# Patient Record
Sex: FEMALE | Race: WHITE | NOT HISPANIC OR LATINO | Employment: PART TIME | URBAN - METROPOLITAN AREA
[De-identification: names, ages, dates, MRNs, and addresses within clinical notes are randomized per-mention and may not be internally consistent; named-entity substitution may affect disease eponyms.]

---

## 2017-12-01 ENCOUNTER — TRANSCRIBE ORDERS (OUTPATIENT)
Dept: ADMINISTRATIVE | Facility: HOSPITAL | Age: 47
End: 2017-12-01

## 2017-12-01 DIAGNOSIS — Z12.39 SCREENING BREAST EXAMINATION: Primary | ICD-10-CM

## 2018-01-08 ENCOUNTER — HOSPITAL ENCOUNTER (OUTPATIENT)
Dept: MAMMOGRAPHY | Facility: CLINIC | Age: 48
Discharge: HOME | End: 2018-01-08
Payer: COMMERCIAL

## 2018-01-08 ENCOUNTER — GENERIC CONVERSION - ENCOUNTER (OUTPATIENT)
Dept: OTHER | Facility: OTHER | Age: 48
End: 2018-01-08

## 2018-01-08 DIAGNOSIS — Z12.39 SCREENING BREAST EXAMINATION: ICD-10-CM

## 2018-01-08 PROCEDURE — 77063 BREAST TOMOSYNTHESIS BI: CPT

## 2018-01-08 PROCEDURE — 77067 SCR MAMMO BI INCL CAD: CPT

## 2018-01-10 NOTE — MISCELLANEOUS
Message  Spoke with patient over the phone today  Her diagnosis is chronic irritation caused by an itch scratch cycle, we do not know the initiating cause  I asked her to stop scratching herself and we will treat her for on a short-term basis with clobetasol        Signatures   Electronically signed by : MARGUERITE Bull ; Feb 4 2016  1:21PM EST                       (Author)

## 2018-06-21 NOTE — PROGRESS NOTES
Assessment/Plan:    Encounter for screening mammogram for malignant neoplasm of breast  -     Mammo screening bilateral w cad; Future    Encounter for annual routine gynecological examination  -     Liquid-based pap, screening      Discussed perimenopause  Discussed weight loss - consider decreasing carbs/increasing proteins and counting her calories  Will refill clobetasol for her lichen  Subjective:      Patient ID: Fe Ceballos is a 57 y o  female  Yearly exam     SAB2  LMP: 2018  Menarche:14  Contraception: condoms  Pap: 2014 neg HPV neg  Mammo: 2018 Birad#2    Tammy Ferrer is here for annual exam   She was last seen in  by Dr Francine Anguiano  , monogamous  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  Eldest daughter is 23yo and will be going to college in the fall - Tammy Ferrer is sad about this  Very active, but notes has put on ~ 10lbs this past year  Interested in weight loss  Has hx lichen simplex chronicus - had steroid cream which worked, but has run out  Last biopsied  by [facility]  Also has recurrent yeast per her report  No bladder, breast, bowel concerns  Has noted some change in her menstrual cycles - are now regular for the first time in her life ~ 28d, has some light spotting with ovulation  Heavier/crampier now than used to be  The following portions of the patient's history were reviewed and updated as appropriate: allergies, current medications, past family history, past medical history, past social history, past surgical history and problem list     Review of Systems   Constitutional: Positive for unexpected weight change (10lbs)  Negative for activity change  Gastrointestinal: Negative for abdominal distention, abdominal pain, constipation and diarrhea  Genitourinary: Negative for dyspareunia, dysuria, menstrual problem, pelvic pain, vaginal bleeding, vaginal discharge and vaginal pain           Objective:    /80   Ht 5' 1 5" (1 562 m) Wt 65 kg (143 lb 6 4 oz)   LMP 06/04/2018   BMI 26 66 kg/m²        Physical Exam   Constitutional: She is oriented to person, place, and time  She appears well-developed and well-nourished  No distress  Pulmonary/Chest: Effort normal  No respiratory distress  Abdominal: Soft  She exhibits no distension  There is no tenderness  Genitourinary: Vagina normal and uterus normal        There is no rash or lesion on the right labia  There is no rash or lesion on the left labia  Uterus is not enlarged and not tender  Cervix exhibits friability  Cervix exhibits no motion tenderness  Right adnexum displays no mass and no tenderness  Left adnexum displays no mass and no tenderness  No vaginal discharge found  Genitourinary Comments: Cervix:  Abnormal vasculature noted, friable  Firm to palpation   Musculoskeletal: Normal range of motion  She exhibits no edema  Neurological: She is alert and oriented to person, place, and time  Skin: Skin is warm and dry  Psychiatric: She has a normal mood and affect  Vitals reviewed

## 2018-06-26 ENCOUNTER — ANNUAL EXAM (OUTPATIENT)
Dept: OBGYN CLINIC | Facility: CLINIC | Age: 48
End: 2018-06-26
Payer: COMMERCIAL

## 2018-06-26 VITALS
DIASTOLIC BLOOD PRESSURE: 80 MMHG | HEIGHT: 62 IN | WEIGHT: 143.4 LBS | BODY MASS INDEX: 26.39 KG/M2 | SYSTOLIC BLOOD PRESSURE: 120 MMHG

## 2018-06-26 DIAGNOSIS — L28.0 LICHEN SIMPLEX CHRONICUS: ICD-10-CM

## 2018-06-26 DIAGNOSIS — Z01.419 ENCOUNTER FOR ANNUAL ROUTINE GYNECOLOGICAL EXAMINATION: Primary | ICD-10-CM

## 2018-06-26 DIAGNOSIS — Z12.31 ENCOUNTER FOR SCREENING MAMMOGRAM FOR MALIGNANT NEOPLASM OF BREAST: ICD-10-CM

## 2018-06-26 PROBLEM — M35.00 SJOEGREN SYNDROME: Status: ACTIVE | Noted: 2018-06-26

## 2018-06-26 PROCEDURE — G0145 SCR C/V CYTO,THINLAYER,RESCR: HCPCS | Performed by: OBSTETRICS & GYNECOLOGY

## 2018-06-26 PROCEDURE — 87624 HPV HI-RISK TYP POOLED RSLT: CPT | Performed by: OBSTETRICS & GYNECOLOGY

## 2018-06-26 PROCEDURE — 99396 PREV VISIT EST AGE 40-64: CPT | Performed by: OBSTETRICS & GYNECOLOGY

## 2018-06-26 RX ORDER — CLOBETASOL PROPIONATE 0.5 MG/G
CREAM TOPICAL 2 TIMES DAILY
COMMUNITY
Start: 2016-02-04 | End: 2018-06-26 | Stop reason: SDUPTHER

## 2018-06-26 RX ORDER — LORAZEPAM 0.5 MG/1
TABLET ORAL
COMMUNITY
Start: 2017-11-14

## 2018-06-26 RX ORDER — KETOCONAZOLE 20 MG/ML
SHAMPOO TOPICAL
Refills: 6 | COMMUNITY
Start: 2018-03-26

## 2018-06-26 RX ORDER — CYCLOSPORINE 0.5 MG/ML
EMULSION OPHTHALMIC
COMMUNITY

## 2018-06-26 RX ORDER — CLOBETASOL PROPIONATE 0.5 MG/G
CREAM TOPICAL 2 TIMES DAILY
Qty: 30 G | Refills: 2 | Status: SHIPPED | OUTPATIENT
Start: 2018-06-26

## 2018-06-26 RX ORDER — AVOBENZONE, HOMOSALATE, OCTISALATE, OCTOCRYLENE, OXYBENZONE 30; 130; 50; 70; 40 MG/ML; MG/ML; MG/ML; MG/ML; MG/ML
1 LOTION TOPICAL
COMMUNITY

## 2018-06-28 LAB — HPV RRNA GENITAL QL NAA+PROBE: NORMAL

## 2018-07-03 LAB
LAB AP GYN PRIMARY INTERPRETATION: NORMAL
Lab: NORMAL

## 2018-11-02 ENCOUNTER — OFFICE VISIT (OUTPATIENT)
Dept: OBGYN CLINIC | Facility: CLINIC | Age: 48
End: 2018-11-02
Payer: COMMERCIAL

## 2018-11-02 VITALS
DIASTOLIC BLOOD PRESSURE: 70 MMHG | HEIGHT: 63 IN | BODY MASS INDEX: 25.16 KG/M2 | WEIGHT: 142 LBS | SYSTOLIC BLOOD PRESSURE: 130 MMHG

## 2018-11-02 DIAGNOSIS — N92.6 IRREGULAR UTERINE BLEEDING: ICD-10-CM

## 2018-11-02 DIAGNOSIS — N93.8 DUB (DYSFUNCTIONAL UTERINE BLEEDING): Primary | ICD-10-CM

## 2018-11-02 LAB — SL AMB POCT URINE HCG: NORMAL

## 2018-11-02 PROCEDURE — 58100 BIOPSY OF UTERUS LINING: CPT | Performed by: NURSE PRACTITIONER

## 2018-11-02 PROCEDURE — 81025 URINE PREGNANCY TEST: CPT | Performed by: NURSE PRACTITIONER

## 2018-11-02 PROCEDURE — 88305 TISSUE EXAM BY PATHOLOGIST: CPT | Performed by: PATHOLOGY

## 2018-11-02 PROCEDURE — 99214 OFFICE O/P EST MOD 30 MIN: CPT | Performed by: NURSE PRACTITIONER

## 2018-11-05 ENCOUNTER — HOSPITAL ENCOUNTER (OUTPATIENT)
Dept: RADIOLOGY | Facility: HOSPITAL | Age: 48
Discharge: HOME | End: 2018-11-05
Payer: COMMERCIAL

## 2018-11-05 DIAGNOSIS — N93.8 DUB (DYSFUNCTIONAL UTERINE BLEEDING): ICD-10-CM

## 2018-11-05 PROCEDURE — 76830 TRANSVAGINAL US NON-OB: CPT

## 2018-11-05 PROCEDURE — 76856 US EXAM PELVIC COMPLETE: CPT

## 2018-11-12 NOTE — ASSESSMENT & PLAN NOTE
Discussed causes of DUB  Advised benign perimenopausal hormone imbalance suspected  EMB collected  Advised pelvic US and TSH  Will discuss options for management when all results are available   Pt agrees with plan

## 2018-11-12 NOTE — PROGRESS NOTES
Endometrial biopsy  Date/Time: 11/12/2018 7:52 AM  Performed by: Eliza Piedra  Authorized by: Eliza Piedra     Consent:     Consent obtained:  Verbal and written    Consent given by:  Patient    Procedural risks discussed:  Bleeding, damage to other organs, infection, failure rate, possible continued pain, possible conversion to open surgery, possible loss of function and repeat procedure    Patient questions answered: yes      Patient agrees, verbalizes understanding, and wants to proceed: yes    Indication:     Indications comment:  Dub  Pre-procedure:     Pre-procedure timeout performed: yes    Procedure:     Procedure: endometrial biopsy with Pipelle      A bivalve speculum was placed in the vagina: yes      Cervix cleaned and prepped: yes      Uterus sounded: yes      Uterus sound depth (cm):  7 5    Specimen collected: specimen collected and sent to pathology      Patient tolerated procedure well with no complications: yes    Findings:     Uterus size:  Non-gravid    Cervix: normal      Adnexa: normal    Comments:      EMB advised for c/o DUB  Reviewed risks/benefits of this procedure and consent was obtained  Allergies confirmed and time out performed  The patient was positioned in lithotomy and spec was inserted  Cervix was visualized and cleansed with betadine x3  Pipelle was passed easily through the cervix and a mod amt of tissue was obtained in two sweeps  The patient tolerated well  Hemostasis noted  All instruments removed  Tissue sent to path  Reviewed post procedure considerations and reasons to call

## 2018-11-12 NOTE — PROGRESS NOTES
Assessment/Plan:    DUB (dysfunctional uterine bleeding)  Discussed causes of DUB  Advised benign perimenopausal hormone imbalance suspected  EMB collected  Advised pelvic US and TSH  Will discuss options for management when all results are available  Pt agrees with plan       Diagnoses and all orders for this visit:    DUB (dysfunctional uterine bleeding)  -     US pelvis complete w transvaginal; Future  -     TSH, 3rd generation; Future  -     Tissue Exam    Irregular uterine bleeding  -     POCT urine HCG          Subjective:      Patient ID: Khai Roldan is a 57 y o  female  This patient presents with c/o irreg menses since 6/2018  Spotting mid cycle in June, July and August  Two full periods in Sept with one week off between  Bleeding with menses has been somewhat heavier and clottier   She does experience cramping with the bleeding   Condoms for contra  She denies preg concerns  Feeling ok otherwise  Medically stable         The following portions of the patient's history were reviewed and updated as appropriate: allergies, current medications, past family history, past medical history, past social history, past surgical history and problem list     Review of Systems   Constitutional: Negative  HENT: Negative  Eyes: Negative  Respiratory: Negative  Cardiovascular: Negative  Gastrointestinal: Negative  Endocrine: Negative  Genitourinary: Positive for menstrual problem  Negative for dysuria, pelvic pain, vaginal bleeding and vaginal discharge  Musculoskeletal: Negative  Skin: Negative  Allergic/Immunologic: Negative  Neurological: Negative  Hematological: Negative  Psychiatric/Behavioral: Negative  Objective:      /70 (BP Location: Right arm, Patient Position: Sitting)   Ht 5' 3" (1 6 m)   Wt 64 4 kg (142 lb)   LMP 10/19/2018   Breastfeeding? No   BMI 25 15 kg/m²          Physical Exam   Constitutional: She is oriented to person, place, and time   She appears well-developed and well-nourished  HENT:   Head: Normocephalic and atraumatic  Eyes: Pupils are equal, round, and reactive to light  Neck: Normal range of motion  Pulmonary/Chest: Effort normal    Abdominal: Hernia confirmed negative in the right inguinal area and confirmed negative in the left inguinal area  Genitourinary: Rectum normal and uterus normal  There is no rash, tenderness, lesion or injury on the right labia  There is no rash, tenderness, lesion or injury on the left labia  Cervix exhibits no motion tenderness, no discharge and no friability  Right adnexum displays no mass, no tenderness and no fullness  Left adnexum displays no mass, no tenderness and no fullness  No erythema, tenderness or bleeding in the vagina  No vaginal discharge found  Musculoskeletal: Normal range of motion  Lymphadenopathy:        Right: No inguinal adenopathy present  Left: No inguinal adenopathy present  Neurological: She is alert and oriented to person, place, and time  Skin: Skin is warm and dry  Psychiatric: She has a normal mood and affect   Her behavior is normal  Judgment and thought content normal

## 2018-11-19 ENCOUNTER — TELEPHONE (OUTPATIENT)
Dept: OBGYN CLINIC | Facility: MEDICAL CENTER | Age: 48
End: 2018-11-19

## 2018-11-19 NOTE — TELEPHONE ENCOUNTER
Reviewed results with patient, lab work will be done shortly   Pt questioned what would be done to resolve the irreg bleeding issue, I advised that once lab results are available, Luis Keen would discuss options

## 2018-11-19 NOTE — TELEPHONE ENCOUNTER
----- Message from Saran Julian Jasen Garcia sent at 11/16/2018 10:08 AM EST -----  Pelvic US was ordered for irreg menses   Uterus is WNL   There is an incidental finding of a ~4cm hemorrhagic cyst on the right ovary  Please advise this is benign and is expected to resolve spontaneously  Left ovary is normal   EMB has also resulted and this was WNL   I would recommend that she observes for sx of ovarian torsion and has repeat pelvic US in 2-3 mos  I will enter this order  Regarding irreg menses - we discussed plan to follow up when results were available for review   Please advise that she may RTO any time to discuss management options

## 2019-01-31 ENCOUNTER — TELEPHONE (OUTPATIENT)
Dept: OBGYN CLINIC | Facility: CLINIC | Age: 49
End: 2019-01-31

## 2019-01-31 DIAGNOSIS — N83.209 CYST OF OVARY, UNSPECIFIED LATERALITY: Primary | ICD-10-CM

## 2019-01-31 NOTE — TELEPHONE ENCOUNTER
Spoke with pt - she is going to be here in March for her yearly and wants to have the mammo and f/u pelvic u/s done at that time  Stated the orders are in her chart she can  She thinks that she will come back here every year for her annuals instead of finding a new GYN in [place]

## 2019-01-31 NOTE — TELEPHONE ENCOUNTER
----- Message from Radha Forward  [address] sent at 1/31/2019 12:30 PM EST -----  Regarding: Non-Urgent Medical Question  Contact: [phone]  I have a follow up question from my last visit/ ultrasound results  It was recommended that I receive a follow up ultrasound to make sure the ovarian cyst has resolved  Just three weeks ago, I moved from [facility] to the [place], [place] area  I am wondering if I should get that follow up ultrasound, and if so, can I get that down here  I do not have new doctors yet  Additionally, I have a script for my annual mammogram, and I am also wondering if I can get that down here  I could request the local testing center send the results to you and your office  Thank you for your time   Iliana Michelle

## 2019-03-04 ENCOUNTER — TRANSCRIBE ORDERS (OUTPATIENT)
Dept: ADMINISTRATIVE | Facility: HOSPITAL | Age: 49
End: 2019-03-04

## 2019-03-04 ENCOUNTER — TELEPHONE (OUTPATIENT)
Dept: OBGYN CLINIC | Facility: CLINIC | Age: 49
End: 2019-03-04

## 2019-03-04 DIAGNOSIS — Z12.31 ENCOUNTER FOR SCREENING MAMMOGRAM FOR MALIGNANT NEOPLASM OF BREAST: Primary | ICD-10-CM

## 2019-03-04 DIAGNOSIS — R92.2 DENSE BREAST TISSUE: ICD-10-CM

## 2019-03-04 DIAGNOSIS — Z12.31 VISIT FOR SCREENING MAMMOGRAM: Primary | ICD-10-CM

## 2019-03-04 NOTE — TELEPHONE ENCOUNTER
Spoke with pt - advised her that the mammo order was entered into her chart  As for the pelvic u/s - order already in pt chart  Patient has it scheduled for 03/20 - she will  the order the day prior

## 2019-03-04 NOTE — TELEPHONE ENCOUNTER
Regarding: Prescription Question  Contact: [phone]  ----- Message from [address] [place] [address] [address], Generic sent at 3/4/2019  2:48 PM EST -----    Hi, I plan to be in [place] in a few weeks, and I have scheduled a follow-up pelvic ultrasound and annual mammogram for March 20  I was hoping to have scripts written for these two services (I did have a mammogram script but have lost it ) Would I be able to get two new scripts for these? Thank you!   Randa Schultz  [address] [place] [address], [address] [facility]

## 2019-03-04 NOTE — TELEPHONE ENCOUNTER
Pt states that she spoke to someone already and they advised her that both scripts are in system if she is having done within  [facility]  She states she is having mammo with us but will stop into [facility] office on 19th or 20th to get copy of U/S script to have done at outside facility due to appointment slots  She offered no other questions or concerns

## 2019-03-04 NOTE — TELEPHONE ENCOUNTER
Regarding: Prescription Question  Contact: [phone]  ----- Message from [address] [place] [address] [address], Generic sent at 3/4/2019  2:48 PM EST -----    Hi, I plan to be in [place] in a few weeks, and I have scheduled a follow-up pelvic ultrasound and annual mammogram for March 20  I was hoping to have scripts written for these two services (I did have a mammogram script but have lost it ) Would I be able to get two new scripts for these? Thank you!   Bekah Livingston  [address] [place] [address], [address] [facility]

## 2019-03-20 ENCOUNTER — HOSPITAL ENCOUNTER (OUTPATIENT)
Dept: RADIOLOGY | Age: 49
Discharge: HOME | End: 2019-03-20
Payer: COMMERCIAL

## 2019-03-20 VITALS — BODY MASS INDEX: 24.8 KG/M2 | WEIGHT: 140 LBS | HEIGHT: 63 IN

## 2019-03-20 DIAGNOSIS — R92.2 DENSE BREAST TISSUE: ICD-10-CM

## 2019-03-20 DIAGNOSIS — Z12.31 ENCOUNTER FOR SCREENING MAMMOGRAM FOR MALIGNANT NEOPLASM OF BREAST: ICD-10-CM

## 2019-03-20 PROCEDURE — 77063 BREAST TOMOSYNTHESIS BI: CPT

## 2019-03-20 PROCEDURE — 77067 SCR MAMMO BI INCL CAD: CPT

## 2019-04-29 ENCOUNTER — TELEPHONE (OUTPATIENT)
Dept: OBGYN CLINIC | Facility: CLINIC | Age: 49
End: 2019-04-29

## 2019-04-29 DIAGNOSIS — N83.8 OVARIAN MASS, RIGHT: Primary | ICD-10-CM

## 2021-03-26 DIAGNOSIS — Z23 ENCOUNTER FOR IMMUNIZATION: ICD-10-CM
